# Patient Record
Sex: MALE | Race: BLACK OR AFRICAN AMERICAN | NOT HISPANIC OR LATINO | URBAN - METROPOLITAN AREA
[De-identification: names, ages, dates, MRNs, and addresses within clinical notes are randomized per-mention and may not be internally consistent; named-entity substitution may affect disease eponyms.]

---

## 2024-10-07 ENCOUNTER — OFFICE VISIT (OUTPATIENT)
Dept: URGENT CARE | Age: 34
End: 2024-10-07
Payer: COMMERCIAL

## 2024-10-07 VITALS
HEART RATE: 71 BPM | SYSTOLIC BLOOD PRESSURE: 145 MMHG | WEIGHT: 150 LBS | DIASTOLIC BLOOD PRESSURE: 72 MMHG | OXYGEN SATURATION: 97 % | TEMPERATURE: 98.4 F

## 2024-10-07 DIAGNOSIS — S29.012A STRAIN OF MID-BACK, INITIAL ENCOUNTER: Primary | ICD-10-CM

## 2024-10-07 PROCEDURE — 96372 THER/PROPH/DIAG INJ SC/IM: CPT | Performed by: PHYSICIAN ASSISTANT

## 2024-10-07 PROCEDURE — 99203 OFFICE O/P NEW LOW 30 MIN: CPT | Performed by: PHYSICIAN ASSISTANT

## 2024-10-07 PROCEDURE — 1036F TOBACCO NON-USER: CPT | Performed by: PHYSICIAN ASSISTANT

## 2024-10-07 RX ORDER — METHOCARBAMOL 750 MG/1
750 TABLET, FILM COATED ORAL 4 TIMES DAILY PRN
Qty: 16 TABLET | Refills: 0 | Status: SHIPPED | OUTPATIENT
Start: 2024-10-07 | End: 2024-10-11

## 2024-10-07 RX ORDER — KETOROLAC TROMETHAMINE 30 MG/ML
30 INJECTION, SOLUTION INTRAMUSCULAR; INTRAVENOUS ONCE
Status: COMPLETED | OUTPATIENT
Start: 2024-10-07 | End: 2024-10-07

## 2024-10-07 RX ORDER — PREDNISONE 20 MG/1
TABLET ORAL
Qty: 10 TABLET | Refills: 0 | Status: SHIPPED | OUTPATIENT
Start: 2024-10-07

## 2024-10-07 NOTE — PROGRESS NOTES
Subjective   Patient ID: Kyle Hicks is a 34 y.o. male. They present today with a chief complaint of Back Pain (Middle Back - constant with movement pain get worse ).    History of Present Illness  HPI  Patient presents to the urgent care as a 34-year-old male with 4 day hx of mid left-sided back pain that began after working on a car with his friends.  Patient states several hours after he worked on the car he began to have mild back pain that progressed the next day, then worsened again when his dog pulled on his leash while walking him.  Patient states the pain was sharp at that time, and became constant.  Patient states he has had something similar in the past but did not have to seek treatment.  Patient reports pain is worse with movement, denies any comfortable position.  Past Medical History  Allergies as of 10/07/2024    (No Known Allergies)       (Not in a hospital admission)       Past Medical History:   Diagnosis Date    Other specified health status     No pertinent past medical history       Past Surgical History:   Procedure Laterality Date    MOUTH SURGERY  10/10/2018    Oral Surgery Tooth Extraction        reports that he has never smoked. He has never used smokeless tobacco. He reports that he does not currently use alcohol. He reports current drug use. Drug: Marijuana.    Review of Systems  Review of Systems     Patient denies numbness, tingling, changes in ROM, weakness in extremities, difficulty w balance, sore throat, fever, rash, dizziness, shortness of breath, increased urinary frequency, chills, peripheral edema, abdominal pain, chest pain, urinary or bowel retention/incontinence/saddle anesthesia.                          Objective    Vitals:    10/07/24 1058   BP: 145/72   Pulse: 71   Temp: 36.9 °C (98.4 °F)   SpO2: 97%   Weight: 68 kg (150 lb)     No LMP for male patient.    Physical Exam  Appearance: Alert, oriented, cooperative, in no acute distress. Well nourished & well  hydrated.    Skin: Intact, no lesions, rash, petechiae or purpura.     Eyes: Conjunctiva pink with no redness or exudates. Eyelids without lesions. No scleral icterus.     ENT: Hearing grossly intact. External inspection of ears without lesions or erythema. no nasal flaring.    Pulmonary:  Good chest wall excursion. No accessory muscle use or stridor. No difficulty completing a full sentence without taking a breath, no labored breathing w ambulation    Cardiac:  No JVD.     Musculoskeletal: no deformity. No cyanosis, clubbing. sensation 5/5 distally in LE, cap. fill < 2 sec distally in LE, FROM of LE and hip, strength 5/5 in LE, 2 pt discrimination intact LE, no erythema or edema of LE or back musculature, or palpable spasms, left mid back with palpable muscle knot approximately at T10-T12 level, no spinal or paraspinal tenderness to palpation, equal dorsiflexion and plantar flexion b/l, neurovascularly intact lower extremities.    Neurological: no focal findings identified.    Psychiatric: Appropriate mood and affect.    Procedures    Point of Care Test & Imaging Results from this visit  No results found for this visit on 10/07/24.   No results found.    Diagnostic study results (if any) were reviewed by Agueda Abraham PA-C.    Assessment/Plan   Allergies, medications, history, and pertinent labs/EKGs/Imaging reviewed by Agueda Abraham PA-C.     Medical Decision Making  Patient denies injury, lower extremity weakness, numbness, tingling, loss of bowel or bladder control, LE edema. Considerations for patient's symptoms include muscular strain, sciatica, spinal stenosis, herniated disc, radiculopathy. Patient declined xray. Physical exam-tenderness with hip flexion, and palpation of left mid back, left knee flexion, strength 5/5, 2 pt discrimination intact LE, no erythema or edema of LE or back musculature, no spinal or paraspinal tenderness to palpation, equal dorsiflexion and plantar flexion b/l. Given  patient's pain is improved by sitting, and worsened by spine flexion, spinal stenosis, and herniated disc considered less likely. Additionally patient denies any radiating pain, burning or tingling down lower extremities to indicate sciatica. Patient denies hx of diabetes, or recent use of steroids. Patient will begin trial of patient has not taken any pain medication today, patient's pain was treated with 30 mg IM of Toradol in office and was prescribed muscle relaxant, and prednisone to treat muscular strain. Patient verbalizes understanding and agrees with plan of care. All questions were answered.     Dictation software was used in the creation of this note which does not evaluate or correct for typographical, spelling, syntax or grammatical errors.      Orders and Diagnoses  There are no diagnoses linked to this encounter.    Medical Admin Record      Patient disposition: Home    Electronically signed by Agueda Abraham PA-C  11:08 AM
